# Patient Record
Sex: MALE | Race: WHITE | Employment: OTHER | URBAN - METROPOLITAN AREA
[De-identification: names, ages, dates, MRNs, and addresses within clinical notes are randomized per-mention and may not be internally consistent; named-entity substitution may affect disease eponyms.]

---

## 2018-02-12 ENCOUNTER — APPOINTMENT (OUTPATIENT)
Dept: GENERAL RADIOLOGY | Age: 78
End: 2018-02-12
Attending: EMERGENCY MEDICINE
Payer: MEDICARE

## 2018-02-12 ENCOUNTER — APPOINTMENT (OUTPATIENT)
Dept: CT IMAGING | Age: 78
End: 2018-02-12
Attending: EMERGENCY MEDICINE
Payer: MEDICARE

## 2018-02-12 ENCOUNTER — HOSPITAL ENCOUNTER (EMERGENCY)
Age: 78
Discharge: HOME | End: 2018-02-13
Attending: EMERGENCY MEDICINE
Payer: MEDICARE

## 2018-02-12 DIAGNOSIS — F10.920 ALCOHOLIC INTOXICATION WITHOUT COMPLICATION (HCC): Primary | ICD-10-CM

## 2018-02-12 DIAGNOSIS — S51.011A SKIN TEAR OF RIGHT ELBOW WITHOUT COMPLICATION, INITIAL ENCOUNTER: ICD-10-CM

## 2018-02-12 LAB — ETHANOL SERPL-MCNC: 283 MG/DL

## 2018-02-12 PROCEDURE — 99284 EMERGENCY DEPT VISIT MOD MDM: CPT | Performed by: EMERGENCY MEDICINE

## 2018-02-12 PROCEDURE — 80307 DRUG TEST PRSMV CHEM ANLYZR: CPT | Performed by: EMERGENCY MEDICINE

## 2018-02-12 PROCEDURE — 73080 X-RAY EXAM OF ELBOW: CPT

## 2018-02-12 PROCEDURE — 70450 CT HEAD/BRAIN W/O DYE: CPT

## 2018-02-13 VITALS
SYSTOLIC BLOOD PRESSURE: 122 MMHG | OXYGEN SATURATION: 98 % | DIASTOLIC BLOOD PRESSURE: 74 MMHG | RESPIRATION RATE: 16 BRPM | HEART RATE: 76 BPM | TEMPERATURE: 98 F | WEIGHT: 270 LBS | HEIGHT: 70 IN | BODY MASS INDEX: 38.65 KG/M2

## 2018-02-13 NOTE — ED NOTES
Right elbow skin tear cleaned with wound  and dressed with neosporin and kerlex. Tolerated well. [facility] ambulance called to transport patient back to [facility].

## 2018-02-13 NOTE — DISCHARGE INSTRUCTIONS
Skin Tears: Care Instructions  Your Care Instructions  As we get older, our skin gets drier and more fragile. Sometimes this can cause the outer layers of skin to split and tear open. Skin tears are treated in different ways. In some cases, doctors use pieces of tape called Steri-Strips to pull the skin together and help it heal. Other times, it's best to leave the tear open and cover it with a special wound-care bandage. Skin tears are usually not serious. They usually heal in a few weeks. But how long you take to heal depends on your body and the type of tear you have. Sometimes the torn piece of skin is used to protect the wound while it heals. But that piece of skin does not heal. It may fall off on its own. Or the doctor may remove it. As your tear heals, it's important to keep it clean to help prevent infection. The doctor has checked you carefully, but problems can develop later. If you notice any problems or new symptoms, get medical treatment right away. Follow-up care is a key part of your treatment and safety. Be sure to make and go to all appointments, and call your doctor if you are having problems. It's also a good idea to know your test results and keep a list of the medicines you take. How can you care for yourself at home? · If you have pain, ask your doctor if you can take an over-the-counter pain medicine, such as acetaminophen (Tylenol), ibuprofen (Advil, Motrin), or naproxen (Aleve). Be safe with medicines. Read and follow all instructions on the label. · If you have a bandage, follow your doctor's instructions for changing it. · If you have Steri-Strips, leave them on until they fall off. · Follow your doctor's instructions about bathing. · Gently wash the skin tear with plain water 2 times a day. Do not rub the area. · Let the area air dry. Or you can pat it carefully with a soft towel. When should you call for help?   Call your doctor now or seek immediate medical care if:  ? · You have signs of infection, such as:  ¨ Increased pain, swelling, warmth, or redness around the tear. ¨ Red streaks leading from the tear. ¨ Pus draining from the tear. ¨ A fever. ? · The tear starts to bleed a lot. Small amounts of blood are normal.   ? Watch closely for changes in your health, and be sure to contact your doctor if:  ? · You do not get better as expected. Where can you learn more? Go to http://thom-apolinar.info/. Enter Y517 in the search box to learn more about \"Skin Tears: Care Instructions. \"  Current as of: March 20, 2017  Content Version: 11.4  © 1048-6705 [employer]. Care instructions adapted under license by [employer] (which disclaims liability or warranty for this information). If you have questions about a medical condition or this instruction, always ask your healthcare professional. [address] any warranty or liability for your use of this information.

## 2018-02-13 NOTE — ED NOTES
I have reviewed discharge instructions with the patient. The patient verbalized understanding. Patient left ED via Discharge Method: stretcher to independent living facility with [facility] ambulance service. Opportunity for questions and clarification provided. Patient given no scripts. To continue your aftercare when you leave the hospital, you may receive an automated call from our care team to check in on how you are doing. This is a free service and part of our promise to provide the best care and service to meet your aftercare needs.  If you have questions, or wish to unsubscribe from this service please call [phone]. Thank you for Choosing our [facility] Emergency Department.

## 2018-02-13 NOTE — ED NOTES
Pt states he was walking, got dizzy and fell to ground. Pt denies LOC, hitting head. Pt reports pain in lower back, hands and feet. Pt states pain in lower back, hands and feet is not new today, \"it has been going on for a long time\". Pt denies dizziness currently, chest pain, syncope, abdominal pain, headache, n/v/d, constipation, sob, urinary complaints, fever, cough, congestion. nonlabored breathing. Pt is alert and oriented. Pt has abrasion on right elbow. Bleeding controlled.

## 2018-02-13 NOTE — ED TRIAGE NOTES
Resides at [facility]. Allowed 2 drinks per night. Possible more ETOH than normal.  Fell in bathroom. BGl 106. NO IV. Pain right elbow with skin tear. No head injury known.

## 2018-02-13 NOTE — ED NOTES
Spoke with MIRTHA Carias. Verified pt has periods of confusion and moves throughout his facility via wheelchair. Mr. Kody Carias requested once ready to discharge to please transport pt back to independent living facility.

## 2018-02-13 NOTE — ED PROVIDER NOTES
HPI Comments: 9:25 PM Patient was seen in triage, a brief history and physical was done. Labs and/or other studies were ordered pending placement of patient in the back. Tay Fontana MD    Presents complaint of fall after drinking this evening. Patient denies any new pain and complains only of his chronic back and leg pain. He does not know where he lives but is alert to the year. Baseline is unknown. Patient is a 68 y.o. male presenting with fall. The history is provided by the patient. Fall   The accident occurred 1 to 2 hours ago. The fall occurred while standing. He fell from a height of ground level. He landed on hard floor. The volume of blood lost was moderate. The point of impact was the right elbow. The pain is present in the right elbow. The patient is experiencing no pain. He was ambulatory at the scene. There was no entrapment after the fall. There was no drug use involved in the accident. There was alcohol use involved in the accident. Associated symptoms include laceration. Pertinent negatives include no numbness, no abdominal pain, no nausea, no vomiting, no headaches, no extremity weakness, no loss of consciousness and no tingling. The risk factors include dementia and being elderly. The symptoms are aggravated by activity. He has tried nothing for the symptoms. The patient's last tetanus shot was less than 5 years ago. Past Medical History:   Diagnosis Date    Cancer [facility])     prostate CA.  Depression     HTN (hypertension), benign        History reviewed. No pertinent surgical history. History reviewed. No pertinent family history. Social History     Social History    Marital status:      Spouse name: N/A    Number of children: N/A    Years of education: N/A     Occupational History    Not on file.      Social History Main Topics    Smoking status: Never Smoker    Smokeless tobacco: Never Used    Alcohol use 8.4 oz/week     0 Standard drinks or equivalent, 14 Glasses of wine per week    Drug use: No    Sexual activity: Not on file     Other Topics Concern    Not on file     Social History Narrative         ALLERGIES: Review of patient's allergies indicates no known allergies. Review of Systems   Unable to perform ROS: Dementia   Gastrointestinal: Negative for abdominal pain, nausea and vomiting. Musculoskeletal: Negative for extremity weakness. Neurological: Negative for tingling, loss of consciousness, numbness and headaches. Vitals:    02/12/18 2117   BP: 115/73   Pulse: 78   Resp: 18   Temp: 98 °F (36.7 °C)   SpO2: 98%   Weight: 122.5 kg (270 lb)   Height: 5' 10\" (1.778 m)            Physical Exam   Constitutional: He appears well-developed and well-nourished. No distress. HENT:   Head: Normocephalic and atraumatic. Neck: Normal range of motion. Neck supple. Cardiovascular: Normal rate and regular rhythm. Pulmonary/Chest: Effort normal and breath sounds normal. No respiratory distress. He has no wheezes. He has no rales. Abdominal: Soft. He exhibits no distension. There is no tenderness. There is no rebound and no guarding. Musculoskeletal: Normal range of motion. He exhibits no edema or tenderness. Small skin tear right elbow   Neurological: He is alert. No cranial nerve deficit. Skin: Skin is warm and dry. Laceration noted. No rash noted. He is not diaphoretic. No erythema. Psychiatric: He has a normal mood and affect. His behavior is normal.   Nursing note and vitals reviewed. MDM  Number of Diagnoses or Management Options  Diagnosis management comments: Nursing called family and patient at baseline is confused. He is typically in a wheelchair and then placed in a wheelchair was going up and down the halls, calm and appropriate. Wound cleaning of his abrasion. Back to nursing home after being here for 4 hours and that medically alcohol level around 120 patient alert.        Amount and/or Complexity of Data Reviewed  Clinical lab tests: ordered and reviewed    Risk of Complications, Morbidity, and/or Mortality  Presenting problems: moderate  Diagnostic procedures: moderate  Management options: moderate    Patient Progress  Patient progress: stable        ED Course       Procedures

## 2019-04-06 ENCOUNTER — HOSPITAL ENCOUNTER (EMERGENCY)
Age: 79
Discharge: ANOTHER HOSPITAL | End: 2019-04-06
Attending: EMERGENCY MEDICINE
Payer: MEDICARE

## 2019-04-06 ENCOUNTER — APPOINTMENT (OUTPATIENT)
Dept: GENERAL RADIOLOGY | Age: 79
End: 2019-04-06
Attending: EMERGENCY MEDICINE
Payer: MEDICARE

## 2019-04-06 VITALS
DIASTOLIC BLOOD PRESSURE: 85 MMHG | SYSTOLIC BLOOD PRESSURE: 145 MMHG | HEIGHT: 70 IN | OXYGEN SATURATION: 98 % | BODY MASS INDEX: 25.05 KG/M2 | TEMPERATURE: 98 F | HEART RATE: 61 BPM | WEIGHT: 175 LBS | RESPIRATION RATE: 18 BRPM

## 2019-04-06 DIAGNOSIS — R68.83 CHILLS (WITHOUT FEVER): Primary | ICD-10-CM

## 2019-04-06 LAB
ALBUMIN SERPL-MCNC: 3.8 G/DL (ref 3.2–4.6)
ALBUMIN/GLOB SERPL: 1.1 {RATIO}
ALP SERPL-CCNC: 183 U/L (ref 50–136)
ALT SERPL-CCNC: 28 U/L (ref 12–65)
ANION GAP SERPL CALC-SCNC: 9 MMOL/L
AST SERPL-CCNC: 26 U/L (ref 15–37)
ATRIAL RATE: 58 BPM
BASOPHILS # BLD: 0 K/UL (ref 0–0.2)
BASOPHILS NFR BLD: 1 % (ref 0–2)
BILIRUB DIRECT SERPL-MCNC: 0.1 MG/DL
BILIRUB SERPL-MCNC: 0.5 MG/DL (ref 0.2–1.1)
BUN SERPL-MCNC: 17 MG/DL (ref 8–23)
CALCIUM SERPL-MCNC: 9.3 MG/DL (ref 8.3–10.4)
CALCULATED P AXIS, ECG09: 61 DEGREES
CALCULATED R AXIS, ECG10: 60 DEGREES
CALCULATED T AXIS, ECG11: 77 DEGREES
CHLORIDE SERPL-SCNC: 101 MMOL/L (ref 98–107)
CO2 SERPL-SCNC: 24 MMOL/L (ref 21–32)
CREAT SERPL-MCNC: 1.29 MG/DL (ref 0.8–1.5)
DIAGNOSIS, 93000: NORMAL
DIFFERENTIAL METHOD BLD: ABNORMAL
EOSINOPHIL # BLD: 0.2 K/UL (ref 0–0.8)
EOSINOPHIL NFR BLD: 3 % (ref 0.5–7.8)
ERYTHROCYTE [DISTWIDTH] IN BLOOD BY AUTOMATED COUNT: 12.2 % (ref 11.9–14.6)
GLOBULIN SER CALC-MCNC: 3.5 G/DL (ref 2.3–3.5)
GLUCOSE SERPL-MCNC: 93 MG/DL (ref 65–100)
HCT VFR BLD AUTO: 44.1 % (ref 41.1–50.3)
HGB BLD-MCNC: 14.9 G/DL (ref 13.6–17.2)
IMM GRANULOCYTES # BLD AUTO: 0 K/UL (ref 0–0.5)
IMM GRANULOCYTES NFR BLD AUTO: 0 % (ref 0–5)
LACTATE BLD-SCNC: 1.97 MMOL/L (ref 0.5–1.9)
LYMPHOCYTES # BLD: 1.5 K/UL (ref 0.5–4.6)
LYMPHOCYTES NFR BLD: 26 % (ref 13–44)
MCH RBC QN AUTO: 31 PG (ref 26.1–32.9)
MCHC RBC AUTO-ENTMCNC: 33.8 G/DL (ref 31.4–35)
MCV RBC AUTO: 91.9 FL (ref 79.6–97.8)
MONOCYTES # BLD: 0.6 K/UL (ref 0.1–1.3)
MONOCYTES NFR BLD: 11 % (ref 4–12)
NEUTS SEG # BLD: 3.6 K/UL (ref 1.7–8.2)
NEUTS SEG NFR BLD: 60 % (ref 43–78)
NRBC # BLD: 0 K/UL (ref 0–0.2)
P-R INTERVAL, ECG05: 188 MS
PLATELET # BLD AUTO: 304 K/UL (ref 150–450)
PMV BLD AUTO: 8.9 FL (ref 9.4–12.3)
POTASSIUM SERPL-SCNC: 4 MMOL/L (ref 3.5–5.1)
PROT SERPL-MCNC: 7.3 G/DL
Q-T INTERVAL, ECG07: 440 MS
QRS DURATION, ECG06: 92 MS
QTC CALCULATION (BEZET), ECG08: 431 MS
RBC # BLD AUTO: 4.8 M/UL (ref 4.23–5.6)
SODIUM SERPL-SCNC: 134 MMOL/L (ref 136–145)
VENTRICULAR RATE, ECG03: 58 BPM
WBC # BLD AUTO: 6 K/UL (ref 4.3–11.1)

## 2019-04-06 PROCEDURE — 99284 EMERGENCY DEPT VISIT MOD MDM: CPT | Performed by: EMERGENCY MEDICINE

## 2019-04-06 PROCEDURE — 80048 BASIC METABOLIC PNL TOTAL CA: CPT

## 2019-04-06 PROCEDURE — 81003 URINALYSIS AUTO W/O SCOPE: CPT | Performed by: EMERGENCY MEDICINE

## 2019-04-06 PROCEDURE — 83605 ASSAY OF LACTIC ACID: CPT

## 2019-04-06 PROCEDURE — 71046 X-RAY EXAM CHEST 2 VIEWS: CPT

## 2019-04-06 PROCEDURE — 85025 COMPLETE CBC W/AUTO DIFF WBC: CPT

## 2019-04-06 PROCEDURE — 80076 HEPATIC FUNCTION PANEL: CPT

## 2019-04-06 PROCEDURE — 93005 ELECTROCARDIOGRAM TRACING: CPT | Performed by: EMERGENCY MEDICINE

## 2019-04-06 NOTE — ED NOTES
Attempted to call report to terri lassiter. No answer. Pt taken to nursing facility by sister via personal car.

## 2019-04-06 NOTE — ED NOTES
I have reviewed discharge instructions with the patient and sister. The patient and sister verbalized understanding. Patient left ED via Discharge Method: wheelchair to Nursing Home with sister. Opportunity for questions and clarification provided. Patient given 0 scripts. To continue your aftercare when you leave the hospital, you may receive an automated call from our care team to check in on how you are doing. This is a free service and part of our promise to provide the best care and service to meet your aftercare needs.  If you have questions, or wish to unsubscribe from this service please call [phone]. Thank you for Choosing our [facility] Emergency Department.

## 2019-04-06 NOTE — ED PROVIDER NOTES
17-year-old white male history of hypertension and prostate cancer and residing in an assisted living facility reportedly had several episodes of chills this morning. Patient reports he does not feel well but cannot provide any specific complaints. He's had no vomiting, diarrhea, abdominal pain, chest pain or headache. The facility did check his temperature and he did not have a fever this morning. The chills lasted just a few seconds at a time. Chills Pertinent negatives include no chest pain, no diarrhea, no vomiting, no congestion, no headaches, no cough, no shortness of breath, no neck pain and no rash. Past Medical History:  
Diagnosis Date  Cancer [facility])   
 prostate CA.  Depression  HTN (hypertension), benign History reviewed. No pertinent surgical history. History reviewed. No pertinent family history. Social History Socioeconomic History  Marital status:  Spouse name: Not on file  Number of children: Not on file  Years of education: Not on file  Highest education level: Not on file Occupational History  Not on file Social Needs  Financial resource strain: Not on file  Food insecurity:  
  Worry: Not on file Inability: Not on file  Transportation needs:  
  Medical: Not on file Non-medical: Not on file Tobacco Use  Smoking status: Never Smoker  Smokeless tobacco: Never Used Substance and Sexual Activity  Alcohol use: Yes Alcohol/week: 8.4 oz Types: 14 Glasses of wine per week  Drug use: No  
 Sexual activity: Not on file Lifestyle  Physical activity:  
  Days per week: Not on file Minutes per session: Not on file  Stress: Not on file Relationships  Social connections:  
  Talks on phone: Not on file Gets together: Not on file Attends [employer] service: Not on file Active member of club or organization: Not on file Attends meetings of clubs or organizations: Not on file Relationship status: Not on file  Intimate partner violence:  
  Fear of current or ex partner: Not on file Emotionally abused: Not on file Physically abused: Not on file Forced sexual activity: Not on file Other Topics Concern  Not on file Social History Narrative  Not on file ALLERGIES: Patient has no known allergies. Review of Systems Constitutional: Positive for chills. Negative for fever. HENT: Negative for congestion. Respiratory: Negative for cough and shortness of breath. Cardiovascular: Negative for chest pain. Gastrointestinal: Negative for abdominal pain, diarrhea, nausea and vomiting. Genitourinary: Negative for dysuria. Musculoskeletal: Negative for back pain and neck pain. Skin: Negative for rash. Neurological: Negative for headaches. Vitals:  
 04/06/19 1934 BP: 159/73 Pulse: 60 Resp: 17 Temp: 98 °F (36.7 °C) SpO2: 98% Weight: 79.4 kg (175 lb) Height: 5' 10\" (1.778 m) Physical Exam  
Constitutional: He is oriented to person, place, and time. He appears well-developed and well-nourished. No distress. HENT:  
Head: Normocephalic and atraumatic. Mouth/Throat: Oropharynx is clear and moist.  
Eyes: Pupils are equal, round, and reactive to light. Conjunctivae and EOM are normal.  
Neck: Normal range of motion. Neck supple. Cardiovascular: Normal rate and regular rhythm. No murmur heard. Pulmonary/Chest: Effort normal and breath sounds normal.  
Abdominal: Soft. He exhibits no distension. There is no tenderness. Musculoskeletal: Normal range of motion. He exhibits no edema. Neurological: He is alert and oriented to person, place, and time. No cranial nerve deficit. Coordination normal.  
Skin: Skin is warm and dry. Psychiatric: He has a normal mood and affect. His behavior is normal.  
Nursing note and vitals reviewed.  
  
 
VASILE 
 Number of Diagnoses or Management Options Diagnosis management comments: Blood work and urine, chest x-ray are all unremarkable. Patient appears safe for discharge home. Etiology for chills not clear at this time but does not appear to be related to an acute infectious process. Amount and/or Complexity of Data Reviewed Clinical lab tests: ordered and reviewed Risk of Complications, Morbidity, and/or Mortality Presenting problems: low Diagnostic procedures: low Management options: low Procedures

## 2019-04-06 NOTE — ED TRIAGE NOTES
Pt in via [facility] from [facility] c/o multiple episodes of \"tremors\" starting at 0700 today. bgl 132 with ems. Pt denies n/v/d/fever/pain/sob.

## 2023-03-23 ENCOUNTER — APPOINTMENT (OUTPATIENT)
Dept: CT IMAGING | Age: 83
End: 2023-03-23

## 2023-03-23 ENCOUNTER — HOSPITAL ENCOUNTER (EMERGENCY)
Age: 83
Discharge: NURSING, REHAB OR LONG-TERM CARE | End: 2023-03-23
Attending: EMERGENCY MEDICINE

## 2023-03-23 VITALS
SYSTOLIC BLOOD PRESSURE: 117 MMHG | DIASTOLIC BLOOD PRESSURE: 65 MMHG | RESPIRATION RATE: 18 BRPM | OXYGEN SATURATION: 95 % | TEMPERATURE: 98 F | HEART RATE: 91 BPM

## 2023-03-23 DIAGNOSIS — S09.90XA INJURY OF HEAD, INITIAL ENCOUNTER: Primary | ICD-10-CM

## 2023-03-23 PROCEDURE — 99284 EMERGENCY DEPT VISIT MOD MDM: CPT

## 2023-03-23 PROCEDURE — 72125 CT NECK SPINE W/O DYE: CPT

## 2023-03-23 PROCEDURE — 70450 CT HEAD/BRAIN W/O DYE: CPT

## 2023-03-23 RX ORDER — LORAZEPAM 0.5 MG/1
0.5 TABLET ORAL EVERY 6 HOURS PRN
COMMUNITY

## 2023-03-23 RX ORDER — DIVALPROEX SODIUM 250 MG/1
250 TABLET, DELAYED RELEASE ORAL 3 TIMES DAILY
COMMUNITY

## 2023-03-23 RX ORDER — MEMANTINE HYDROCHLORIDE 10 MG/1
10 TABLET ORAL 2 TIMES DAILY
COMMUNITY

## 2023-03-23 RX ORDER — TRAZODONE HYDROCHLORIDE 50 MG/1
50 TABLET ORAL NIGHTLY
COMMUNITY

## 2023-03-23 RX ORDER — LEVOTHYROXINE SODIUM 0.05 MG/1
50 TABLET ORAL DAILY
COMMUNITY

## 2023-03-23 RX ORDER — OLANZAPINE 2.5 MG/1
2.5 TABLET ORAL NIGHTLY
COMMUNITY

## 2023-03-23 RX ORDER — TRAMADOL HYDROCHLORIDE 50 MG/1
50 TABLET ORAL EVERY 6 HOURS PRN
COMMUNITY

## 2023-03-23 ASSESSMENT — PAIN SCALES - GENERAL: PAINLEVEL_OUTOF10: 7

## 2023-03-23 ASSESSMENT — PAIN - FUNCTIONAL ASSESSMENT: PAIN_FUNCTIONAL_ASSESSMENT: 0-10

## 2023-03-23 ASSESSMENT — PAIN DESCRIPTION - PAIN TYPE: TYPE: ACUTE PAIN

## 2023-03-23 NOTE — ED PROVIDER NOTES
Final Result   CT HEAD:   1. Slightly prominent gas within the bilateral cavernous sinuses with foci of   gas within the dural venous sinuses and left  space without associated   calvarial or skull base fracture. Findings can be seen following IV insertion   and air within the IV tubing . Otherwise no acute intracranial process or   calvarial fracture. CT C-SPINE:   1. No fracture or traumatic malalignment of the cervical spine. 2. Extensive degenerative disc disease and multilevel spondylolisthesis. Voice dictation software was used during the making of this note. This software is not perfect and grammatical and other typographical errors may be present. This note has not been completely proofread for errors.      Donna Moreira, [address]  03/23/23 1140

## 2023-03-23 NOTE — ED TRIAGE NOTES
Patient arrives via [facility] from [facility]. Fall from wheelchair and hit left fronto temporal area. EMS reports it was bleeding profusely initially. Bleeding controlled at this time. No loss of consciousness reported. Patient was apparently given many sedatives prior to the fall.

## 2023-03-23 NOTE — ED NOTES
I have reviewed discharge instructions with the patient. The patient verbalized understanding. Patient left ED via Discharge Method: ambulatory to Home with [facility]. Report given to [facility] prior to transport. Opportunity for questions and clarification provided. Patient given 0 scripts. To continue your aftercare when you leave the hospital, you may receive an automated call from our care team to check in on how you are doing. This is a free service and part of our promise to provide the best care and service to meet your aftercare needs.  If you have questions, or wish to unsubscribe from this service please call [phone]. Thank you for Choosing our [facility] Emergency Department.         Jez Jaimes RN  03/23/23 4586

## 2023-03-23 NOTE — ED NOTES
Report given to [facility]. Pt transported back to facility.      Mila Roy RN  03/23/23 5717